# Patient Record
(demographics unavailable — no encounter records)

---

## 2025-05-29 NOTE — HISTORY OF PRESENT ILLNESS
[FreeTextEntry1] : 56 year old Rh male is here with tremor in both hands. He always had a mild action tremor, was not bothered by it. A year ago, he noticed the tremor got worse mostly in left hand. He notices the tremor while holding objects. Denies any tremor on rest. Reports difficulty with drinking. Denies any change in dexterity. No difficulty with tying shoelaces, buttoning shirts. Tremor gets worse in social setting. Hasn't noticed any relation with caffeine or alcohol. Denies any change in walking or balance, No fall. He ambulates independently and does all ADLs himself. Denies any stiffness aside from some knee pain. No difficulty getting out of chair.  Non-motor: Sleeps 4-6 hours/night, has sleep apnea, not on CPAP. Denies talking or acting out in sleep. Reports constipation attributing to using Saxenda No anosmia, no dysphagia No dizziness. Reports difficulty with people's name. Mood has been good.    Current meds: Rosuvastatin  Saxenda   Recent labs from 10/2024 TSH:1.09 FT4, CMP : WNL   Family Hx: Father and Grandfather had PD, father diagnosed in 70s, he also had action tremor: was on beta blocker. Grandfather diagnosed in 60s.  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

## 2025-05-29 NOTE — DISCUSSION/SUMMARY
[FreeTextEntry1] : 56 year old RH male presenting for longstanding hand tremor with recent worsening over past one year along with family Hx of both tremor and PD. Exam notable for mild action tremor mostly on left hand, no sign of Parkinsonism. His presentation mostly consistent with Essential tremor.  Plan: - Obtain Ceruloplasmin level  - MR brain w/o contrast to r/o secondary cause - For now, can try Propranolol 10 mg as needed for socail events, Rx sent.  - Above impression discussed with patient.  Follow up in 6-12 months  Patient is seen and discussed with Dr. Ashley WINTERS Movement Disorder Fellow

## 2025-05-29 NOTE — PHYSICAL EXAM
[Person] : oriented to person [Place] : oriented to place [Time] : oriented to time [Span Intact] : the attention span was normal [Concentration Intact] : normal concentrating ability [Naming Objects] : no difficulty naming common objects [Repeating Phrases] : no difficulty repeating a phrase [Fluency] : fluency intact [Comprehension] : comprehension intact [Cranial Nerves Optic (II)] : visual acuity intact bilaterally,  visual fields full to confrontation, pupils equal round and reactive to light [Cranial Nerves Oculomotor (III)] : extraocular motion intact [Cranial Nerves Trigeminal (V)] : facial sensation intact symmetrically [Cranial Nerves Facial (VII)] : face symmetrical [Cranial Nerves Glossopharyngeal (IX)] : tongue and palate midline [Cranial Nerves Accessory (XI - Cranial And Spinal)] : head turning and shoulder shrug symmetric [Cranial Nerves Hypoglossal (XII)] : there was no tongue deviation with protrusion [Motor Strength] : muscle strength was normal in all four extremities [Motor Handedness Right-Handed] : the patient is right hand dominant [Sensation Tactile Decrease] : light touch was intact [Sensation Pain / Temperature Decrease] : pain and temperature was intact [3+] : Patella left 3+ [2+] : Ankle jerk left 2+ [Romberg's Sign] : Romberg's sign was negtive [Past-pointing] : there was no past-pointing [Coordination - Dysmetria Impaired Finger-to-Nose Bilateral] : not present [Plantar Reflex Right Only] : normal on the right [Plantar Reflex Left Only] : normal on the left [___] : absent on the right [___] : absent on the left [FreeTextEntry1] : Movement Exam   Facial expression: normal Blink rate: normal   Vertical eye movements intact without square wave jerks   Speech: no hypophonia   Rigidity of limbs: absent   Resting tremor: none Postural tremor: none Action tremor: mild on left, minimal on right  Archimedes [place] demonstrated: minimal waveform   NO Bradykinesia with finger tapping, hand supination/pronation, foot tapping, foot stomping. No dysmetria with finger to nose   Gait: able to stand without assistance. Normal posture, stride length and arm swing. No freezing of gait. Able to walk on heels and toes Retropulsion test: able to recover easily

## 2025-05-29 NOTE — END OF VISIT
[] : Fellow [FreeTextEntry3] : HPI outlined above  There is no facial masking. EOMI. Slight VT. There is absence of rest tremor. There is no PT and 1+ Left >>R KT, Gurpreet do no decrement. There is no dysmetria in his limbs. Tone is normal. Gait is normal with intact tandem walking. Spirals laclk waveforms and pours normally. Handwriting is legible  Assessment: Probable mild ET with left side predominance. There are no signs of parkinsonism. Discussed impression with patient and recommended inderal 10mg prn to be taken prior to social events --which are when he tends to notice tremor most.  Agree with recommendations outlined above. RTC 6-12months [Time Spent: ___ minutes] : I have spent [unfilled] minutes of time on the encounter which excludes teaching and separately reported services.